# Patient Record
Sex: FEMALE | Race: WHITE | NOT HISPANIC OR LATINO | URBAN - METROPOLITAN AREA
[De-identification: names, ages, dates, MRNs, and addresses within clinical notes are randomized per-mention and may not be internally consistent; named-entity substitution may affect disease eponyms.]

---

## 2024-06-10 ENCOUNTER — APPOINTMENT (RX ONLY)
Dept: URBAN - METROPOLITAN AREA CLINIC 149 | Facility: CLINIC | Age: 69
Setting detail: DERMATOLOGY
End: 2024-06-10

## 2024-06-10 DIAGNOSIS — Z85.828 PERSONAL HISTORY OF OTHER MALIGNANT NEOPLASM OF SKIN: ICD-10-CM

## 2024-06-10 DIAGNOSIS — L259 CONTACT DERMATITIS AND OTHER ECZEMA, UNSPECIFIED CAUSE: ICD-10-CM | Status: WORSENING

## 2024-06-10 DIAGNOSIS — D485 NEOPLASM OF UNCERTAIN BEHAVIOR OF SKIN: ICD-10-CM | Status: INADEQUATELY CONTROLLED

## 2024-06-10 DIAGNOSIS — L57.8 OTHER SKIN CHANGES DUE TO CHRONIC EXPOSURE TO NONIONIZING RADIATION: ICD-10-CM

## 2024-06-10 DIAGNOSIS — Z71.89 OTHER SPECIFIED COUNSELING: ICD-10-CM

## 2024-06-10 PROBLEM — L23.9 ALLERGIC CONTACT DERMATITIS, UNSPECIFIED CAUSE: Status: ACTIVE | Noted: 2024-06-10

## 2024-06-10 PROBLEM — D48.5 NEOPLASM OF UNCERTAIN BEHAVIOR OF SKIN: Status: ACTIVE | Noted: 2024-06-10

## 2024-06-10 PROCEDURE — 99214 OFFICE O/P EST MOD 30 MIN: CPT | Mod: 25

## 2024-06-10 PROCEDURE — ? DIAGNOSIS COMMENT

## 2024-06-10 PROCEDURE — ? PRESCRIPTION

## 2024-06-10 PROCEDURE — ? TREATMENT REGIMEN

## 2024-06-10 PROCEDURE — ? PATIENT SPECIFIC COUNSELING

## 2024-06-10 PROCEDURE — ? EDUCATIONAL RESOURCES PROVIDED

## 2024-06-10 PROCEDURE — 11306 SHAVE SKIN LESION 0.6-1.0 CM: CPT

## 2024-06-10 PROCEDURE — ? SHAVE REMOVAL

## 2024-06-10 PROCEDURE — ? PRESCRIPTION MEDICATION MANAGEMENT

## 2024-06-10 PROCEDURE — ? COUNSELING

## 2024-06-10 RX ORDER — DOXYCYCLINE HYCLATE 100 MG/1
CAPSULE, GELATIN COATED ORAL BID
Qty: 2 | Refills: 0 | Status: ERX | COMMUNITY
Start: 2024-06-10

## 2024-06-10 RX ORDER — HYDROQUINONE 40 MG/G
CREAM TOPICAL
Qty: 28.35 | Refills: 2 | Status: ERX | COMMUNITY
Start: 2024-06-10

## 2024-06-10 RX ORDER — TRETIONIN 0.5 MG/G
CREAM TOPICAL
Qty: 45 | Refills: 1 | Status: ERX | COMMUNITY
Start: 2024-06-10

## 2024-06-10 RX ORDER — FLUOCINONIDE 0.5 MG/G
CREAM TOPICAL
Qty: 60 | Refills: 1 | Status: ERX | COMMUNITY
Start: 2024-06-10

## 2024-06-10 RX ADMIN — DOXYCYCLINE HYCLATE: 100 CAPSULE, GELATIN COATED ORAL at 00:00

## 2024-06-10 RX ADMIN — FLUOCINONIDE: 0.5 CREAM TOPICAL at 00:00

## 2024-06-10 RX ADMIN — TRETIONIN: 0.5 CREAM TOPICAL at 00:00

## 2024-06-10 RX ADMIN — HYDROQUINONE: 40 CREAM TOPICAL at 00:00

## 2024-06-10 ASSESSMENT — LOCATION ZONE DERM: LOCATION ZONE: NECK

## 2024-06-10 ASSESSMENT — LOCATION SIMPLE DESCRIPTION DERM: LOCATION SIMPLE: NECK

## 2024-06-10 ASSESSMENT — LOCATION DETAILED DESCRIPTION DERM: LOCATION DETAILED: LEFT SUPERIOR LATERAL NECK

## 2024-06-10 NOTE — PROCEDURE: PRESCRIPTION MEDICATION MANAGEMENT
Initiate Treatment: See photo for instructions
Detail Level: Generalized
Render In Strict Bullet Format?: No

## 2024-06-10 NOTE — PROCEDURE: SHAVE REMOVAL
Hemostasis: TCA 20%
Consent was obtained from the patient. The risks and benefits to therapy were discussed in detail. Specifically, the risks of infection, scarring, bleeding, prolonged wound healing, incomplete removal, allergy to anesthesia, and recurrence were addressed. Prior to the procedure, the treatment site was clearly identified and confirmed by the patient. All components of Universal Protocol/PAUSE Rule completed.
Bill For Surgical Tray: no
Medical Necessity Clause: This procedure was medically necessary because the lesion that was treated was: The exact behavior of this lesion is in question.
Size Of Lesion In Cm (Required): 0.8
Anesthesia Type: 1% lidocaine with epinephrine
Post-Care Instructions: -\\n-\\nPost op care was discussed and includes cleaning the area with soap and water and then rinsing the area with a tablespoon of table vinegar in a cup of tap water. Pat dry and then apply Polysporin ointment if available and petrolatum if not. Cover with bandage if the area is likely to get dirty or rubbed. Do this until healed or 10 days. Call if not seeming to heal after 10 days or the area looks infected. Should the patient develop any fevers, chills, bleeding, severe pain patient will contact the office immediately or go to your regular doctor, urgent care or the ER.-\\n-
Billing Type: Third-Party Bill
Lab: -[id number]
Was A Bandage Applied: Yes
Notification Instructions: Patient will be notified of pathology results. However, patient instructed to call the office if not contacted within 2 weeks.
Medical Necessity Information: It is in your best interest to select a reason for this procedure from the list below. All of these items fulfill various CMS LCD requirements except the new and changing color options.
Biopsy Method: 10 blade
Detail Level: Detailed
Anesthesia Volume In Cc: 0.5
X Size Of Lesion In Cm (Optional): 0
Depth Of Shave: dermis
Wound Care: Petrolatum

## 2024-09-16 ENCOUNTER — APPOINTMENT (RX ONLY)
Dept: URBAN - METROPOLITAN AREA CLINIC 149 | Facility: CLINIC | Age: 69
Setting detail: DERMATOLOGY
End: 2024-09-16

## 2024-09-16 DIAGNOSIS — Z85.828 PERSONAL HISTORY OF OTHER MALIGNANT NEOPLASM OF SKIN: ICD-10-CM

## 2024-09-16 DIAGNOSIS — Z71.89 OTHER SPECIFIED COUNSELING: ICD-10-CM

## 2024-09-16 DIAGNOSIS — L81.4 OTHER MELANIN HYPERPIGMENTATION: ICD-10-CM

## 2024-09-16 DIAGNOSIS — D69.2 OTHER NONTHROMBOCYTOPENIC PURPURA: ICD-10-CM

## 2024-09-16 DIAGNOSIS — L30.9 DERMATITIS, UNSPECIFIED: ICD-10-CM

## 2024-09-16 DIAGNOSIS — L259 CONTACT DERMATITIS AND OTHER ECZEMA, UNSPECIFIED CAUSE: ICD-10-CM

## 2024-09-16 DIAGNOSIS — L57.8 OTHER SKIN CHANGES DUE TO CHRONIC EXPOSURE TO NONIONIZING RADIATION: ICD-10-CM

## 2024-09-16 PROBLEM — L23.9 ALLERGIC CONTACT DERMATITIS, UNSPECIFIED CAUSE: Status: ACTIVE | Noted: 2024-09-16

## 2024-09-16 PROCEDURE — ? PRESCRIPTION

## 2024-09-16 PROCEDURE — 99214 OFFICE O/P EST MOD 30 MIN: CPT

## 2024-09-16 PROCEDURE — ? TREATMENT REGIMEN

## 2024-09-16 PROCEDURE — ? PATIENT SPECIFIC COUNSELING

## 2024-09-16 PROCEDURE — ? PRESCRIPTION MEDICATION MANAGEMENT

## 2024-09-16 PROCEDURE — ? COUNSELING

## 2024-09-16 PROCEDURE — ? EDUCATIONAL RESOURCES PROVIDED

## 2024-09-16 RX ORDER — DESONIDE 0.5 MG/G
CREAM TOPICAL
Qty: 30 | Refills: 0 | Status: ERX | COMMUNITY
Start: 2024-09-16

## 2024-09-16 RX ORDER — ECONAZOLE NITRATE 10 MG/G
CREAM TOPICAL
Qty: 30 | Refills: 0 | Status: ERX | COMMUNITY
Start: 2024-09-16

## 2024-09-16 RX ADMIN — DESONIDE: 0.5 CREAM TOPICAL at 00:00

## 2024-09-16 RX ADMIN — ECONAZOLE NITRATE: 10 CREAM TOPICAL at 00:00

## 2024-09-16 ASSESSMENT — LOCATION DETAILED DESCRIPTION DERM: LOCATION DETAILED: RIGHT AXILLARY VAULT

## 2024-09-16 ASSESSMENT — LOCATION ZONE DERM: LOCATION ZONE: AXILLAE

## 2024-09-16 ASSESSMENT — LOCATION SIMPLE DESCRIPTION DERM: LOCATION SIMPLE: RIGHT AXILLARY VAULT

## 2024-09-16 NOTE — PROCEDURE: PRESCRIPTION MEDICATION MANAGEMENT
Plan: \\n
Continue Regimen: AM \\n\\nFluocinonide cream 0.05% Rx (steroid) to rash areas, usually not for face, underarm unless directed. \\n\\nWait 1-2 minutes then apply the following. \\n\\nCeraVe Cream OTC or Iliana cream OTC (Not Lotions) CeraVe is preferable (for moisturizer, eczema, anti-itch) \\n\\nSarna Calm & Cool with Menthol lotion OTC that gives temporary itch relief.  It can be used as often as \\n\\nneeded.  It may sting area you have scratched raw. \\n\\n  \\n\\nPM \\n\\nFluocinonide cream 0.05% Rx (steroid) to rash areas, usually not for face, underarm unless directed. \\n\\nWait 1-2 minutes then apply the following. \\n\\nAmmonium Lactate Cream or Lotion 10 or 15 % OTC, AmLactin brand either Rapid Relief or Ultra is preferable, \\n\\nSarna Calm & Cool with Menthol lotion OTC that gives temporary itch relief.  It can be used as often as \\n\\nneeded.  It may sting area you have scratched raw. 
Detail Level: Generalized
Render In Strict Bullet Format?: No
Plan: \\nFor face continue\\n\\nAM\\n\\nHydroquinone 4% cream Rx,\\n\\nWait 1-2 minutes then apply the following.\\n\\nNeutrogena Hydro Boost + Niacinamide Serum 10% 1 oz (other brands of Niacinamide topical will be ok) OTC,\\n\\n \\n\\nPM\\n\\nTretinoin 0.05% cream/gel Rx\\n\\nWait 1-2 minutes then apply the following.\\n\\nHydroquinone 4% cream Rx,\\n\\n \\n\\nFor easy bruising, this is common on arms, hands, and lower legs secondary to sun exposure over the years but should not be on trunk.\\n\\n \\n\\nTo help prevent further damage and reverse past damage do the following\\n\\nThis is not easy, but nothing works that is easier\\n\\nIt is a lifelong treatment\\n\\n \\n\\nAM\\n\\nNeutrogena Ultra Gentle Face Gel Hydrator, 4% Niacinamide, 5 oz\\n\\nWait 1-2 minutes then apply\\n\\nAmmonium Lactate Cream or Lotion 10 or 15 % OTC, AmLactin brand either Rapid Relief or Ultra is preferable,\\n\\nWait 1-2 minutes then apply the following.\\n\\nSunscreen, see list below\\n\\n \\n\\nPM\\n\\nTretinoin 0.05% cream/gel Rx, this product is hardest to use, is Rx only and cost more but helps a lot if used but not necessary.\\n\\Quyen that is needed, for example for an entire arm or face is the size of a pea on the fingertip, dot it around and rub it into areas where you have sun damage.  At first use only Monday, Wednesday, and Friday nights.  After a month if not too dry you may use 5 nights per week or every night.  This can make you sun sensitive. \\n\\nWait 1-2 minutes then apply the following.\\n\\nNeutrogena Ultra Gentle Face Gel Hydrator, 4% Niacinamide, 5 oz\\n\\n \\n\\nUse sun protective clothing; long sleeves made with sun protective materials, gloves, hats, etc\\n\\n \\n\\nBest Sunscreens\\n\\nSOLBAR® Shield SPF 40 is one of the best.\\n\\nOther sunscreens to consider are Neutrogena Sheer Zinc Face 50,               Neutrogena Pure and Free Baby 50, Neutrogena Sheer Zinc Kids 50, Neutrogena Sensitive Skin 60.\\n
Initiate Treatment: For underarm, we do not know the cause for the (DermNOS), use this for 1 month\\n\\nAM and PM
Initiate Treatment: Dermend Bruise Cream OTC Bid to bruise area

## 2025-03-10 ENCOUNTER — APPOINTMENT (OUTPATIENT)
Dept: URBAN - METROPOLITAN AREA CLINIC 149 | Facility: CLINIC | Age: 70
Setting detail: DERMATOLOGY
End: 2025-03-10

## 2025-03-10 DIAGNOSIS — L64.8 OTHER ANDROGENIC ALOPECIA: ICD-10-CM | Status: INADEQUATELY CONTROLLED

## 2025-03-10 DIAGNOSIS — L30.9 DERMATITIS, UNSPECIFIED: ICD-10-CM | Status: RESOLVED

## 2025-03-10 DIAGNOSIS — L259 CONTACT DERMATITIS AND OTHER ECZEMA, UNSPECIFIED CAUSE: ICD-10-CM

## 2025-03-10 DIAGNOSIS — L57.8 OTHER SKIN CHANGES DUE TO CHRONIC EXPOSURE TO NONIONIZING RADIATION: ICD-10-CM

## 2025-03-10 DIAGNOSIS — L81.4 OTHER MELANIN HYPERPIGMENTATION: ICD-10-CM

## 2025-03-10 DIAGNOSIS — Z71.89 OTHER SPECIFIED COUNSELING: ICD-10-CM

## 2025-03-10 DIAGNOSIS — Z85.828 PERSONAL HISTORY OF OTHER MALIGNANT NEOPLASM OF SKIN: ICD-10-CM

## 2025-03-10 DIAGNOSIS — D69.2 OTHER NONTHROMBOCYTOPENIC PURPURA: ICD-10-CM

## 2025-03-10 PROBLEM — L65.9 NONSCARRING HAIR LOSS, UNSPECIFIED: Status: ACTIVE | Noted: 2025-03-10

## 2025-03-10 PROBLEM — L23.9 ALLERGIC CONTACT DERMATITIS, UNSPECIFIED CAUSE: Status: ACTIVE | Noted: 2025-03-10

## 2025-03-10 PROCEDURE — ? DIAGNOSIS COMMENT

## 2025-03-10 PROCEDURE — ? EDUCATIONAL RESOURCES PROVIDED

## 2025-03-10 PROCEDURE — ? PRESCRIPTION MEDICATION MANAGEMENT

## 2025-03-10 PROCEDURE — ? TREATMENT REGIMEN

## 2025-03-10 PROCEDURE — ? PRESCRIPTION

## 2025-03-10 PROCEDURE — ? COUNSELING

## 2025-03-10 PROCEDURE — ? PATIENT SPECIFIC COUNSELING

## 2025-03-10 PROCEDURE — 99214 OFFICE O/P EST MOD 30 MIN: CPT

## 2025-03-10 RX ORDER — TRETIONIN 0.5 MG/G
CREAM TOPICAL
Qty: 20 | Refills: 1 | Status: ERX

## 2025-03-10 RX ORDER — FLUOCINONIDE 0.5 MG/G
CREAM TOPICAL
Qty: 60 | Refills: 1 | Status: ERX

## 2025-03-10 RX ORDER — SPIRONOLACTONE 50 MG/1
TABLET, FILM COATED ORAL
Qty: 30 | Refills: 1 | Status: ERX | COMMUNITY
Start: 2025-03-10

## 2025-03-10 RX ORDER — HYDROQUINONE 40 MG/G
CREAM TOPICAL
Qty: 28.35 | Refills: 1 | Status: ERX

## 2025-03-10 RX ADMIN — SPIRONOLACTONE: 50 TABLET, FILM COATED ORAL at 00:00

## 2025-03-10 ASSESSMENT — LOCATION SIMPLE DESCRIPTION DERM: LOCATION SIMPLE: RIGHT AXILLARY VAULT

## 2025-03-10 ASSESSMENT — LOCATION DETAILED DESCRIPTION DERM: LOCATION DETAILED: RIGHT AXILLARY VAULT

## 2025-03-10 ASSESSMENT — LOCATION ZONE DERM: LOCATION ZONE: AXILLAE

## 2025-03-10 NOTE — PROCEDURE: PRESCRIPTION MEDICATION MANAGEMENT
Render In Strict Bullet Format?: No
Detail Level: Generalized
Plan: For face continue\\n\\nAM\\n\\nHydroquinone 4% cream Rx,\\n\\nWait 1-2 minutes then apply the following.\\n\\nNeutrogena Hydro Boost + Niacinamide Serum 10% 1 oz (other brands of Niacinamide topical will be ok) OTC,\\n\\n \\n\\nPM\\n\\nTretinoin 0.05% cream/gel Rx\\n\\nWait 1-2 minutes then apply the following.\\n\\nHydroquinone 4% cream Rx,\\n\\n \\n\\nFor easy bruising, this is common on arms, hands, and lower legs secondary to sun exposure over the years but should not be on trunk.\\n\\n \\n\\nTo help prevent further damage and reverse past damage do the following\\n\\nThis is not easy, but nothing works that is easier\\n\\nIt is a lifelong treatment\\n\\n \\n\\nAM\\n\\nNeutrogena Ultra Gentle Face Gel Hydrator, 4% Niacinamide, 5 oz\\n\\nWait 1-2 minutes then apply\\n\\nAmmonium Lactate Cream or Lotion 10 or 15 % OTC, AmLactin brand either Rapid Relief or Ultra is preferable,\\n\\nWait 1-2 minutes then apply the following.\\n\\nSunscreen, see list below\\n\\n \\n\\nPM\\n\\nTretinoin 0.05% cream/gel Rx, this product is hardest to use, is Rx only and cost more but helps a lot if used but not necessary.\\n\\Quyen that is needed, for example for an entire arm or face is the size of a pea on the fingertip, dot it around and rub it into areas where you have sun damage.  At first use only Monday, Wednesday, and Friday nights.  After a month if not too dry you may use 5 nights per week or every night.  This can make you sun sensitive. \\n\\nWait 1-2 minutes then apply the following.\\n\\nNeutrogena Ultra Gentle Face Gel Hydrator, 4% Niacinamide, 5 oz\\n\\n \\n\\nUse sun protective clothing; long sleeves made with sun protective materials, gloves, hats, etc\\n\\n \\n\\nBest Sunscreens\\n\\nSOLBAR® Shield SPF 40 is one of the best.\\n\\nOther sunscreens to consider are Neutrogena Sheer Zinc Face 50,               Neutrogena Pure and Free Baby 50, Neutrogena Sheer Zinc Kids 50, Neutrogena Sensitive Skin 60.
Continue Regimen: AM \\n\\nFluocinonide cream 0.05% Rx (steroid) to rash areas, usually not for face, underarm unless directed. \\n\\nWait 1-2 minutes then apply the following. \\n\\nCeraVe Cream OTC or Iliana cream OTC (Not Lotions) CeraVe is preferable (for moisturizer, eczema, anti-itch) \\n\\nSarna Calm & Cool with Menthol lotion OTC that gives temporary itch relief.  It can be used as often as \\n\\nneeded.  It may sting area you have scratched raw. \\n\\n  \\n\\nPM \\n\\nFluocinonide cream 0.05% Rx (steroid) to rash areas, usually not for face, underarm unless directed. \\n\\nWait 1-2 minutes then apply the following. \\n\\nAmmonium Lactate Cream or Lotion 10 or 15 % OTC, AmLactin brand either Rapid Relief or Ultra is preferable, \\n\\nSarna Calm & Cool with Menthol lotion OTC that gives temporary itch relief.  It can be used as often as \\n\\nneeded.  It may sting area you have scratched raw.
Initiate Treatment: Dermend Bruise Cream OTC Bid to bruise area
Initiate Treatment: See photo for tx instructions